# Patient Record
Sex: MALE | URBAN - METROPOLITAN AREA
[De-identification: names, ages, dates, MRNs, and addresses within clinical notes are randomized per-mention and may not be internally consistent; named-entity substitution may affect disease eponyms.]

---

## 2017-06-27 ENCOUNTER — IMPORTED ENCOUNTER (OUTPATIENT)
Dept: URBAN - METROPOLITAN AREA CLINIC 1 | Facility: CLINIC | Age: 62
End: 2017-06-27

## 2017-06-27 PROBLEM — H04.123: Noted: 2017-06-27

## 2017-06-27 PROBLEM — H16.143: Noted: 2017-06-27

## 2017-06-27 PROBLEM — H35.3122: Noted: 2017-06-27

## 2017-06-27 PROBLEM — Z96.1: Noted: 2017-06-27

## 2017-06-27 PROCEDURE — 92014 COMPRE OPH EXAM EST PT 1/>: CPT

## 2017-06-27 NOTE — PATIENT DISCUSSION
1.  ARMD OS intermediate/dry/stable. Importance of daily AREDS II study multivitamin and Amsler Grid checks discussed with patient. Patient to follow-up immediately with any new onset of decreased vision and/or metamorphopsia. 2. Dry Eyes OU - Cont ATs BID OU Routinely. 3.  Pseudophakia OU - (Standard w/ LRI) 4. H/o LASIK OU (Dr. Laura Martinez) Letter to PCP Return for an appointment in 6 mo 10 dfe with Dr. Vinnie García.

## 2017-08-04 ENCOUNTER — IMPORTED ENCOUNTER (OUTPATIENT)
Dept: URBAN - METROPOLITAN AREA CLINIC 1 | Facility: CLINIC | Age: 62
End: 2017-08-04

## 2017-08-04 PROBLEM — H04.123: Noted: 2017-08-04

## 2017-08-04 PROBLEM — H11.32: Noted: 2017-08-04

## 2017-08-04 PROBLEM — Z96.1: Noted: 2017-08-04

## 2017-08-04 PROCEDURE — 99213 OFFICE O/P EST LOW 20 MIN: CPT

## 2017-08-04 NOTE — PATIENT DISCUSSION
1.  Subconjunctival hemorrhage OS -- Reassurance given. Condition discussed with patient. No FB present today no signs of a corneal abrasion. May use Ofloxacin previously prescribed at [facility]. 2.  Dry Eyes OU -- Recommended to patient to use Artificial Tears BID OU3. Pseudophakia OU - (Standard w/ LRI OU) 4. H/o ARMD OS 5. H/o LASIK OU (Dr. Lenell Snellen for an appointment for Return as scheduled with Dr. Jacklyn Kim.

## 2022-04-02 ASSESSMENT — VISUAL ACUITY
OS_CC: 20/40
OD_CC: 20/40
OD_GLARE: 20/100
OS_GLARE: 20/100
OD_CC: 20/40
OS_SC: 20/30
OD_SC: 20/30
OS_CC: 20/40

## 2022-04-02 ASSESSMENT — TONOMETRY
OD_IOP_MMHG: 13
OS_IOP_MMHG: 13
OS_IOP_MMHG: 12